# Patient Record
Sex: MALE | HISPANIC OR LATINO | Employment: UNEMPLOYED | URBAN - METROPOLITAN AREA
[De-identification: names, ages, dates, MRNs, and addresses within clinical notes are randomized per-mention and may not be internally consistent; named-entity substitution may affect disease eponyms.]

---

## 2018-07-02 ENCOUNTER — APPOINTMENT (OUTPATIENT)
Dept: RADIOLOGY | Facility: MEDICAL CENTER | Age: 38
End: 2018-07-02
Attending: EMERGENCY MEDICINE
Payer: COMMERCIAL

## 2018-07-02 ENCOUNTER — HOSPITAL ENCOUNTER (OUTPATIENT)
Facility: MEDICAL CENTER | Age: 38
End: 2018-07-03
Attending: EMERGENCY MEDICINE | Admitting: INTERNAL MEDICINE
Payer: COMMERCIAL

## 2018-07-02 DIAGNOSIS — R07.2 PRECORDIAL CHEST PAIN: ICD-10-CM

## 2018-07-02 LAB
ALBUMIN SERPL BCP-MCNC: 4.6 G/DL (ref 3.2–4.9)
ALBUMIN/GLOB SERPL: 1.6 G/DL
ALP SERPL-CCNC: 71 U/L (ref 30–99)
ALT SERPL-CCNC: 25 U/L (ref 2–50)
ANION GAP SERPL CALC-SCNC: 11 MMOL/L (ref 0–11.9)
APTT PPP: 27.3 SEC (ref 24.7–36)
AST SERPL-CCNC: 18 U/L (ref 12–45)
BASOPHILS # BLD AUTO: 0.4 % (ref 0–1.8)
BASOPHILS # BLD: 0.04 K/UL (ref 0–0.12)
BILIRUB SERPL-MCNC: 0.6 MG/DL (ref 0.1–1.5)
BNP SERPL-MCNC: 11 PG/ML (ref 0–100)
BUN SERPL-MCNC: 17 MG/DL (ref 8–22)
CALCIUM SERPL-MCNC: 9.5 MG/DL (ref 8.5–10.5)
CHLORIDE SERPL-SCNC: 106 MMOL/L (ref 96–112)
CO2 SERPL-SCNC: 22 MMOL/L (ref 20–33)
CREAT SERPL-MCNC: 0.86 MG/DL (ref 0.5–1.4)
EKG IMPRESSION: NORMAL
EOSINOPHIL # BLD AUTO: 0.13 K/UL (ref 0–0.51)
EOSINOPHIL NFR BLD: 1.4 % (ref 0–6.9)
ERYTHROCYTE [DISTWIDTH] IN BLOOD BY AUTOMATED COUNT: 36.9 FL (ref 35.9–50)
GLOBULIN SER CALC-MCNC: 2.9 G/DL (ref 1.9–3.5)
GLUCOSE SERPL-MCNC: 103 MG/DL (ref 65–99)
HCT VFR BLD AUTO: 42.9 % (ref 42–52)
HGB BLD-MCNC: 15.6 G/DL (ref 14–18)
IMM GRANULOCYTES # BLD AUTO: 0.04 K/UL (ref 0–0.11)
IMM GRANULOCYTES NFR BLD AUTO: 0.4 % (ref 0–0.9)
INR PPP: 1.02 (ref 0.87–1.13)
LIPASE SERPL-CCNC: 29 U/L (ref 11–82)
LYMPHOCYTES # BLD AUTO: 2.81 K/UL (ref 1–4.8)
LYMPHOCYTES NFR BLD: 30.9 % (ref 22–41)
MCH RBC QN AUTO: 30.7 PG (ref 27–33)
MCHC RBC AUTO-ENTMCNC: 36.4 G/DL (ref 33.7–35.3)
MCV RBC AUTO: 84.4 FL (ref 81.4–97.8)
MONOCYTES # BLD AUTO: 0.63 K/UL (ref 0–0.85)
MONOCYTES NFR BLD AUTO: 6.9 % (ref 0–13.4)
NEUTROPHILS # BLD AUTO: 5.44 K/UL (ref 1.82–7.42)
NEUTROPHILS NFR BLD: 60 % (ref 44–72)
NRBC # BLD AUTO: 0 K/UL
NRBC BLD-RTO: 0 /100 WBC
PLATELET # BLD AUTO: 207 K/UL (ref 164–446)
PMV BLD AUTO: 10.9 FL (ref 9–12.9)
POTASSIUM SERPL-SCNC: 3.5 MMOL/L (ref 3.6–5.5)
PROT SERPL-MCNC: 7.5 G/DL (ref 6–8.2)
PROTHROMBIN TIME: 13.1 SEC (ref 12–14.6)
RBC # BLD AUTO: 5.08 M/UL (ref 4.7–6.1)
SODIUM SERPL-SCNC: 139 MMOL/L (ref 135–145)
TROPONIN I SERPL-MCNC: <0.01 NG/ML (ref 0–0.04)
WBC # BLD AUTO: 9.1 K/UL (ref 4.8–10.8)

## 2018-07-02 PROCEDURE — 83690 ASSAY OF LIPASE: CPT

## 2018-07-02 PROCEDURE — 85610 PROTHROMBIN TIME: CPT

## 2018-07-02 PROCEDURE — 99285 EMERGENCY DEPT VISIT HI MDM: CPT

## 2018-07-02 PROCEDURE — A9270 NON-COVERED ITEM OR SERVICE: HCPCS | Performed by: EMERGENCY MEDICINE

## 2018-07-02 PROCEDURE — G0378 HOSPITAL OBSERVATION PER HR: HCPCS

## 2018-07-02 PROCEDURE — 80053 COMPREHEN METABOLIC PANEL: CPT

## 2018-07-02 PROCEDURE — 84484 ASSAY OF TROPONIN QUANT: CPT

## 2018-07-02 PROCEDURE — 700102 HCHG RX REV CODE 250 W/ 637 OVERRIDE(OP): Performed by: EMERGENCY MEDICINE

## 2018-07-02 PROCEDURE — 93005 ELECTROCARDIOGRAM TRACING: CPT

## 2018-07-02 PROCEDURE — 85730 THROMBOPLASTIN TIME PARTIAL: CPT

## 2018-07-02 PROCEDURE — 83880 ASSAY OF NATRIURETIC PEPTIDE: CPT

## 2018-07-02 PROCEDURE — 85025 COMPLETE CBC W/AUTO DIFF WBC: CPT

## 2018-07-02 PROCEDURE — 71045 X-RAY EXAM CHEST 1 VIEW: CPT

## 2018-07-02 PROCEDURE — 93005 ELECTROCARDIOGRAM TRACING: CPT | Performed by: EMERGENCY MEDICINE

## 2018-07-02 PROCEDURE — 99220 PR INITIAL OBSERVATION CARE,LEVL III: CPT | Performed by: INTERNAL MEDICINE

## 2018-07-02 RX ORDER — PROMETHAZINE HYDROCHLORIDE 25 MG/1
12.5-25 SUPPOSITORY RECTAL EVERY 4 HOURS PRN
Status: DISCONTINUED | OUTPATIENT
Start: 2018-07-02 | End: 2018-07-03 | Stop reason: HOSPADM

## 2018-07-02 RX ORDER — ASPIRIN 325 MG
325 TABLET ORAL DAILY
Status: DISCONTINUED | OUTPATIENT
Start: 2018-07-03 | End: 2018-07-03 | Stop reason: HOSPADM

## 2018-07-02 RX ORDER — BISACODYL 10 MG
10 SUPPOSITORY, RECTAL RECTAL
Status: DISCONTINUED | OUTPATIENT
Start: 2018-07-02 | End: 2018-07-03 | Stop reason: HOSPADM

## 2018-07-02 RX ORDER — IBUPROFEN 200 MG
600 TABLET ORAL EVERY 8 HOURS PRN
COMMUNITY

## 2018-07-02 RX ORDER — NITROGLYCERIN 0.4 MG/1
0.4 TABLET SUBLINGUAL
Status: DISCONTINUED | OUTPATIENT
Start: 2018-07-02 | End: 2018-07-03 | Stop reason: HOSPADM

## 2018-07-02 RX ORDER — CHLORAL HYDRATE 500 MG
2000 CAPSULE ORAL EVERY EVENING
Status: SHIPPED | COMMUNITY
End: 2024-02-08

## 2018-07-02 RX ORDER — PROMETHAZINE HYDROCHLORIDE 25 MG/1
12.5-25 TABLET ORAL EVERY 4 HOURS PRN
Status: DISCONTINUED | OUTPATIENT
Start: 2018-07-02 | End: 2018-07-03 | Stop reason: HOSPADM

## 2018-07-02 RX ORDER — ONDANSETRON 2 MG/ML
4 INJECTION INTRAMUSCULAR; INTRAVENOUS EVERY 4 HOURS PRN
Status: DISCONTINUED | OUTPATIENT
Start: 2018-07-02 | End: 2018-07-03 | Stop reason: HOSPADM

## 2018-07-02 RX ORDER — ASPIRIN 300 MG/1
300 SUPPOSITORY RECTAL DAILY
Status: DISCONTINUED | OUTPATIENT
Start: 2018-07-03 | End: 2018-07-03 | Stop reason: HOSPADM

## 2018-07-02 RX ORDER — ASPIRIN 81 MG/1
324 TABLET, CHEWABLE ORAL ONCE
Status: COMPLETED | OUTPATIENT
Start: 2018-07-02 | End: 2018-07-02

## 2018-07-02 RX ORDER — ACETAMINOPHEN 325 MG/1
650 TABLET ORAL EVERY 6 HOURS PRN
Status: DISCONTINUED | OUTPATIENT
Start: 2018-07-02 | End: 2018-07-03 | Stop reason: HOSPADM

## 2018-07-02 RX ORDER — POLYETHYLENE GLYCOL 3350 17 G/17G
1 POWDER, FOR SOLUTION ORAL
Status: DISCONTINUED | OUTPATIENT
Start: 2018-07-02 | End: 2018-07-03 | Stop reason: HOSPADM

## 2018-07-02 RX ORDER — ASPIRIN 81 MG/1
324 TABLET, CHEWABLE ORAL DAILY
Status: DISCONTINUED | OUTPATIENT
Start: 2018-07-03 | End: 2018-07-03 | Stop reason: HOSPADM

## 2018-07-02 RX ORDER — ONDANSETRON 4 MG/1
4 TABLET, ORALLY DISINTEGRATING ORAL EVERY 4 HOURS PRN
Status: DISCONTINUED | OUTPATIENT
Start: 2018-07-02 | End: 2018-07-03 | Stop reason: HOSPADM

## 2018-07-02 RX ORDER — ASPIRIN 300 MG/1
300 SUPPOSITORY RECTAL ONCE
Status: COMPLETED | OUTPATIENT
Start: 2018-07-02 | End: 2018-07-02

## 2018-07-02 RX ORDER — AMOXICILLIN 250 MG
2 CAPSULE ORAL 2 TIMES DAILY
Status: DISCONTINUED | OUTPATIENT
Start: 2018-07-03 | End: 2018-07-03 | Stop reason: HOSPADM

## 2018-07-02 RX ADMIN — ASPIRIN 324 MG: 81 TABLET, CHEWABLE ORAL at 19:21

## 2018-07-02 ASSESSMENT — ENCOUNTER SYMPTOMS
DIZZINESS: 1
NECK PAIN: 0
BRUISES/BLEEDS EASILY: 0
COUGH: 0
SORE THROAT: 0
BLURRED VISION: 0
SPUTUM PRODUCTION: 0
FLANK PAIN: 0
WHEEZING: 0
BLOOD IN STOOL: 0
SHORTNESS OF BREATH: 1
PALPITATIONS: 0
DIAPHORESIS: 1
VOMITING: 0
BACK PAIN: 0
HEADACHES: 0
ABDOMINAL PAIN: 0
NAUSEA: 0
SEIZURES: 0
DIARRHEA: 0
FEVER: 0
FOCAL WEAKNESS: 0
CHILLS: 0
MYALGIAS: 0

## 2018-07-02 ASSESSMENT — PAIN SCALES - GENERAL: PAINLEVEL_OUTOF10: 4

## 2018-07-03 ENCOUNTER — APPOINTMENT (OUTPATIENT)
Dept: RADIOLOGY | Facility: MEDICAL CENTER | Age: 38
End: 2018-07-03
Attending: INTERNAL MEDICINE
Payer: COMMERCIAL

## 2018-07-03 ENCOUNTER — PATIENT OUTREACH (OUTPATIENT)
Dept: HEALTH INFORMATION MANAGEMENT | Facility: OTHER | Age: 38
End: 2018-07-03

## 2018-07-03 VITALS
TEMPERATURE: 98.6 F | WEIGHT: 198.19 LBS | HEART RATE: 82 BPM | DIASTOLIC BLOOD PRESSURE: 75 MMHG | BODY MASS INDEX: 25.44 KG/M2 | OXYGEN SATURATION: 98 % | SYSTOLIC BLOOD PRESSURE: 127 MMHG | RESPIRATION RATE: 16 BRPM | HEIGHT: 74 IN

## 2018-07-03 PROBLEM — E87.6 HYPOKALEMIA: Status: RESOLVED | Noted: 2018-07-03 | Resolved: 2018-07-03

## 2018-07-03 PROBLEM — R07.89 ATYPICAL CHEST PAIN: Status: RESOLVED | Noted: 2018-07-03 | Resolved: 2018-07-03

## 2018-07-03 PROBLEM — E87.6 HYPOKALEMIA: Status: ACTIVE | Noted: 2018-07-03

## 2018-07-03 PROBLEM — R07.9 CHEST PAIN: Status: ACTIVE | Noted: 2018-07-03

## 2018-07-03 PROBLEM — R07.89 ATYPICAL CHEST PAIN: Status: ACTIVE | Noted: 2018-07-03

## 2018-07-03 PROBLEM — I10 ESSENTIAL HYPERTENSION: Status: ACTIVE | Noted: 2018-07-03

## 2018-07-03 LAB
ANION GAP SERPL CALC-SCNC: 14 MMOL/L (ref 0–11.9)
BASOPHILS # BLD AUTO: 0.6 % (ref 0–1.8)
BASOPHILS # BLD: 0.06 K/UL (ref 0–0.12)
BUN SERPL-MCNC: 14 MG/DL (ref 8–22)
CALCIUM SERPL-MCNC: 9.5 MG/DL (ref 8.5–10.5)
CHLORIDE SERPL-SCNC: 110 MMOL/L (ref 96–112)
CHOLEST SERPL-MCNC: 153 MG/DL (ref 100–199)
CO2 SERPL-SCNC: 20 MMOL/L (ref 20–33)
CREAT SERPL-MCNC: 0.76 MG/DL (ref 0.5–1.4)
EKG IMPRESSION: NORMAL
EOSINOPHIL # BLD AUTO: 0.07 K/UL (ref 0–0.51)
EOSINOPHIL NFR BLD: 0.7 % (ref 0–6.9)
ERYTHROCYTE [DISTWIDTH] IN BLOOD BY AUTOMATED COUNT: 37.4 FL (ref 35.9–50)
EST. AVERAGE GLUCOSE BLD GHB EST-MCNC: 103 MG/DL
GLUCOSE SERPL-MCNC: 103 MG/DL (ref 65–99)
HBA1C MFR BLD: 5.2 % (ref 0–5.6)
HCT VFR BLD AUTO: 43 % (ref 42–52)
HDLC SERPL-MCNC: 34 MG/DL
HGB BLD-MCNC: 15 G/DL (ref 14–18)
IMM GRANULOCYTES # BLD AUTO: 0.02 K/UL (ref 0–0.11)
IMM GRANULOCYTES NFR BLD AUTO: 0.2 % (ref 0–0.9)
LDLC SERPL CALC-MCNC: 97 MG/DL
LYMPHOCYTES # BLD AUTO: 3.25 K/UL (ref 1–4.8)
LYMPHOCYTES NFR BLD: 34.7 % (ref 22–41)
MCH RBC QN AUTO: 29.8 PG (ref 27–33)
MCHC RBC AUTO-ENTMCNC: 34.9 G/DL (ref 33.7–35.3)
MCV RBC AUTO: 85.5 FL (ref 81.4–97.8)
MONOCYTES # BLD AUTO: 0.7 K/UL (ref 0–0.85)
MONOCYTES NFR BLD AUTO: 7.5 % (ref 0–13.4)
NEUTROPHILS # BLD AUTO: 5.27 K/UL (ref 1.82–7.42)
NEUTROPHILS NFR BLD: 56.3 % (ref 44–72)
NRBC # BLD AUTO: 0 K/UL
NRBC BLD-RTO: 0 /100 WBC
PLATELET # BLD AUTO: 218 K/UL (ref 164–446)
PMV BLD AUTO: 10.9 FL (ref 9–12.9)
POTASSIUM SERPL-SCNC: 3.7 MMOL/L (ref 3.6–5.5)
RBC # BLD AUTO: 5.03 M/UL (ref 4.7–6.1)
SODIUM SERPL-SCNC: 144 MMOL/L (ref 135–145)
TRIGL SERPL-MCNC: 111 MG/DL (ref 0–149)
TROPONIN I SERPL-MCNC: <0.01 NG/ML (ref 0–0.04)
TROPONIN I SERPL-MCNC: <0.01 NG/ML (ref 0–0.04)
TSH SERPL DL<=0.005 MIU/L-ACNC: 0.89 UIU/ML (ref 0.38–5.33)
WBC # BLD AUTO: 9.4 K/UL (ref 4.8–10.8)

## 2018-07-03 PROCEDURE — 80061 LIPID PANEL: CPT

## 2018-07-03 PROCEDURE — 84484 ASSAY OF TROPONIN QUANT: CPT

## 2018-07-03 PROCEDURE — 93005 ELECTROCARDIOGRAM TRACING: CPT | Performed by: INTERNAL MEDICINE

## 2018-07-03 PROCEDURE — 700102 HCHG RX REV CODE 250 W/ 637 OVERRIDE(OP): Performed by: INTERNAL MEDICINE

## 2018-07-03 PROCEDURE — 80048 BASIC METABOLIC PNL TOTAL CA: CPT

## 2018-07-03 PROCEDURE — 85025 COMPLETE CBC W/AUTO DIFF WBC: CPT

## 2018-07-03 PROCEDURE — G0378 HOSPITAL OBSERVATION PER HR: HCPCS

## 2018-07-03 PROCEDURE — 93010 ELECTROCARDIOGRAM REPORT: CPT | Performed by: INTERNAL MEDICINE

## 2018-07-03 PROCEDURE — A9502 TC99M TETROFOSMIN: HCPCS

## 2018-07-03 PROCEDURE — 700111 HCHG RX REV CODE 636 W/ 250 OVERRIDE (IP)

## 2018-07-03 PROCEDURE — 99217 PR OBSERVATION CARE DISCHARGE: CPT | Performed by: INTERNAL MEDICINE

## 2018-07-03 PROCEDURE — A9270 NON-COVERED ITEM OR SERVICE: HCPCS | Performed by: INTERNAL MEDICINE

## 2018-07-03 PROCEDURE — 83036 HEMOGLOBIN GLYCOSYLATED A1C: CPT

## 2018-07-03 PROCEDURE — 84443 ASSAY THYROID STIM HORMONE: CPT

## 2018-07-03 RX ORDER — OMEPRAZOLE 20 MG/1
20 CAPSULE, DELAYED RELEASE ORAL DAILY
Qty: 30 CAP | Refills: 0 | Status: SHIPPED | COMMUNITY
Start: 2018-07-03 | End: 2024-02-08

## 2018-07-03 RX ORDER — REGADENOSON 0.08 MG/ML
INJECTION, SOLUTION INTRAVENOUS
Status: COMPLETED
Start: 2018-07-03 | End: 2018-07-03

## 2018-07-03 RX ADMIN — REGADENOSON 0.4 MG: 0.08 INJECTION, SOLUTION INTRAVENOUS at 10:06

## 2018-07-03 RX ADMIN — ACETAMINOPHEN 650 MG: 325 TABLET, FILM COATED ORAL at 00:39

## 2018-07-03 RX ADMIN — ASPIRIN 324 MG: 81 TABLET, CHEWABLE ORAL at 11:02

## 2018-07-03 ASSESSMENT — COPD QUESTIONNAIRES
DO YOU EVER COUGH UP ANY MUCUS OR PHLEGM?: NO/ONLY WITH OCCASIONAL COLDS OR INFECTIONS
COPD SCREENING SCORE: 2
DURING THE PAST 4 WEEKS HOW MUCH DID YOU FEEL SHORT OF BREATH: NONE/LITTLE OF THE TIME
HAVE YOU SMOKED AT LEAST 100 CIGARETTES IN YOUR ENTIRE LIFE: YES

## 2018-07-03 ASSESSMENT — PATIENT HEALTH QUESTIONNAIRE - PHQ9
SUM OF ALL RESPONSES TO PHQ9 QUESTIONS 1 AND 2: 0
2. FEELING DOWN, DEPRESSED, IRRITABLE, OR HOPELESS: NOT AT ALL
2. FEELING DOWN, DEPRESSED, IRRITABLE, OR HOPELESS: NOT AT ALL
SUM OF ALL RESPONSES TO PHQ9 QUESTIONS 1 AND 2: 0
1. LITTLE INTEREST OR PLEASURE IN DOING THINGS: NOT AT ALL
1. LITTLE INTEREST OR PLEASURE IN DOING THINGS: NOT AT ALL

## 2018-07-03 ASSESSMENT — LIFESTYLE VARIABLES
CONSUMPTION TOTAL: NEGATIVE
TOTAL SCORE: 0
EVER_SMOKED: YES
EVER_SMOKED: NEVER
HAVE YOU EVER FELT YOU SHOULD CUT DOWN ON YOUR DRINKING: NO
ON A TYPICAL DAY WHEN YOU DRINK ALCOHOL HOW MANY DRINKS DO YOU HAVE: 2
TOTAL SCORE: 0
EVER FELT BAD OR GUILTY ABOUT YOUR DRINKING: NO
ALCOHOL_USE: YES
HOW MANY TIMES IN THE PAST YEAR HAVE YOU HAD 5 OR MORE DRINKS IN A DAY: 0
TOTAL SCORE: 0
AVERAGE NUMBER OF DAYS PER WEEK YOU HAVE A DRINK CONTAINING ALCOHOL: 2
HAVE PEOPLE ANNOYED YOU BY CRITICIZING YOUR DRINKING: NO
EVER HAD A DRINK FIRST THING IN THE MORNING TO STEADY YOUR NERVES TO GET RID OF A HANGOVER: NO

## 2018-07-03 ASSESSMENT — PAIN SCALES - GENERAL
PAINLEVEL_OUTOF10: 4
PAINLEVEL_OUTOF10: 4

## 2018-07-03 NOTE — PROGRESS NOTES
IV dc'd.  Discharge instructions given to patient; patient verbalizes understanding, all questions answered.  Copy of DC summary provided, signed copy in chart.  Pt states personal belongings are in possession.  Pt escorted off unit by this nurse without incident.

## 2018-07-03 NOTE — DISCHARGE PLANNING
Care Transition Team Assessment      Met with pt and his wife at bedside. According to pt he lives with his wife and 2 children aged 10 and 13 years. Pt said he is independent at baseline, does not use assistive devices. Pt confirmed he does not have insurance, referred to Patient Financial Assistance. Spoke with Saniya from [facility] who confirmed she will see pt.    Information Source  Orientation : Oriented x 4  Information Given By: Patient, Spouse  Who is responsible for making decisions for patient? : Patient    Readmission Evaluation  Is this a readmission?: No    Elopement Risk  Legal Hold: No  Ambulatory or Self Mobile in Wheelchair: Yes  Disoriented: No  Psychiatric Symptoms: None  History of Wandering: No  Elopement this Admit: No  Vocalizing Wanting to Leave: No  Displays Behaviors, Body Language Wanting to Leave: No-Not at Risk for Elopement  Elopement Risk: Not at Risk for Elopement    Interdisciplinary Discharge Planning  Does Admitting Nurse Feel This Could be a Complex Discharge?: No  Primary Care Physician: none  Lives with - Patient's Self Care Capacity: Spouse  Patient or legal guardian wants to designate a caregiver (see row info): No  Support Systems: Spouse / Significant Other  Able to Return to Previous ADL's: Yes  Mobility Issues: No  Prior Services: None  Patient Expects to be Discharged to:: Home  Assistance Needed: No  Durable Medical Equipment: Not Applicable    Discharge Preparedness  What are your discharge supports?: Spouse  Prior Functional Level: Independent with Activities of Daily Living  Difficulity with ADLs: None  Difficulity with IADLs: None    Functional Assesment  Prior Functional Level: Independent with Activities of Daily Living    Finances  Prescription Coverage: No  Prescription Coverage Comments: No Insurance    Vision / Hearing Impairment  Vision Impairment : No  Hearing Impairment : No      Advance Directive  Advance Directive?: None     Anticipated Discharge  Information  Anticipated discharge disposition: Home  Discharge Address: [address]  Discharge Contact Phone Number: [phone]

## 2018-07-03 NOTE — PROGRESS NOTES
Pt arrived from er via wheelchair.  Admission and assessment complete.  No distress noted.  Call light in reach.  Bed in low position.  Will monitor

## 2018-07-03 NOTE — ASSESSMENT & PLAN NOTE
Uncontrolled  I will start the patient on losartan 25 mg daily  IV labetalol as needed for SBP greater than 160

## 2018-07-03 NOTE — ASSESSMENT & PLAN NOTE
Rule out ACS  Initial EKG and troponin negative  Continuous cardiac monitoring with serial EKG and troponin  Nuclear medicine cardiac stress test in the morning if troponin remains negative  Patient has been given full dose of aspirin  Check lipid panel, TSH and hemoglobin A1c  Nitro and morphine when necessary for chest pain

## 2018-07-03 NOTE — H&P
Hospital Medicine History & Physical Note    Date of Service  7/2/2018    Primary Care Physician  Pcp Pt States None    Consultants  None    Code Status  Full Code    Chief Complaint  Chest pain    History of Presenting Illness  38 y.o. male who presented 7/2/2018 with intermittent chest pain for the past 6 days.  Patient reports substernal pressure-like chest pain with radiation to the left shoulder.  He rates the pain at 7/10 intensity.  He denies any relieving or exacerbating factors.  He reports associated shortness of breath, diaphoresis and lightheadedness.  He denies any loss of consciousness.  He denies any leg swelling.  He denies any recent travel or history of blood clots.    Initial EKG independently reviewed by me reveals sinus rhythm with no acute ischemic changes.  Chest x-ray independently reviewed by me reveals no acute cardiopulmonary process, no focal pulmonary infiltrates.  Initial troponin is less than 0.01    Review of Systems  Review of Systems   Constitutional: Positive for diaphoresis. Negative for chills and fever.   HENT: Negative for hearing loss and sore throat.    Eyes: Negative for blurred vision.   Respiratory: Positive for shortness of breath. Negative for cough, sputum production and wheezing.    Cardiovascular: Positive for chest pain. Negative for palpitations and leg swelling.   Gastrointestinal: Negative for abdominal pain, blood in stool, diarrhea, nausea and vomiting.   Genitourinary: Negative for dysuria, flank pain and urgency.   Musculoskeletal: Negative for back pain, joint pain, myalgias and neck pain.   Skin: Negative for rash.   Neurological: Positive for dizziness. Negative for focal weakness, seizures and headaches.   Endo/Heme/Allergies: Does not bruise/bleed easily.   Psychiatric/Behavioral: Negative for suicidal ideas.   All other systems reviewed and are negative.      Past Medical History  Hypertension    Surgical History  No pertinent surgical history    Family  History  Denies family history of coronary artery disease    Social History   reports that he quit smoking about 7 years ago. He has never used smokeless tobacco. He reports that he drinks alcohol. He reports that he does not use drugs.    Allergies  No Known Allergies    Medications  Prior to Admission Medications   Prescriptions Last Dose Informant Patient Reported? Taking?   Omega-3 Fatty Acids (FISH OIL) 1000 MG Cap capsule 7/1/2018 at 1930  Yes Yes   Sig: Take 2,000 mg by mouth every evening.   ibuprofen (MOTRIN) 200 MG Tab 7/2/2018 at 1100  Yes Yes   Sig: Take 600 mg by mouth every 8 hours as needed for Mild Pain.   multivitamin (THERAGRAN) Tab 7/2/2018 at 0730  Yes Yes   Sig: Take 1 Tab by mouth every day.      Facility-Administered Medications: None       Physical Exam  Blood Pressure: (!) 166/97   Temperature: 37.1 °C (98.8 °F)   Pulse: 76   Respiration: 16   Pulse Oximetry: 94 %     Physical Exam   Constitutional: He is oriented to person, place, and time. He appears well-developed and well-nourished. No distress.   HENT:   Head: Normocephalic and atraumatic.   Mouth/Throat: Oropharynx is clear and moist.   Eyes: Conjunctivae are normal. Pupils are equal, round, and reactive to light. No scleral icterus.   Neck: Normal range of motion. Neck supple.   Cardiovascular: Normal rate, regular rhythm and normal heart sounds.    Pulmonary/Chest: Effort normal and breath sounds normal. No respiratory distress. He has no wheezes. He has no rales.   Abdominal: Soft. Bowel sounds are normal. He exhibits no distension. There is no tenderness. There is no rebound.   Musculoskeletal: Normal range of motion. He exhibits no edema or tenderness.   Lymphadenopathy:     He has no cervical adenopathy.   Neurological: He is alert and oriented to person, place, and time. No cranial nerve deficit. Coordination normal.   Skin: Skin is warm. No rash noted.   Psychiatric: He has a normal mood and affect. His behavior is normal.    Nursing note and vitals reviewed.      Laboratory:  Recent Labs      07/02/18   1855   WBC  9.1   RBC  5.08   HEMOGLOBIN  15.6   HEMATOCRIT  42.9   MCV  84.4   MCH  30.7   MCHC  36.4*   RDW  36.9   PLATELETCT  207   MPV  10.9     Recent Labs      07/02/18   1855   SODIUM  139   POTASSIUM  3.5*   CHLORIDE  106   CO2  22   GLUCOSE  103*   BUN  17   CREATININE  0.86   CALCIUM  9.5     Recent Labs      07/02/18   1855   ALTSGPT  25   ASTSGOT  18   ALKPHOSPHAT  71   TBILIRUBIN  0.6   LIPASE  29   GLUCOSE  103*     Recent Labs      07/02/18   1855   APTT  27.3   INR  1.02     Recent Labs      07/02/18   1855   BNPBTYPENAT  11         Lab Results   Component Value Date    TROPONINI <0.01 07/02/2018       Urinalysis:    No results found for: SPECGRAVITY, GLUCOSEUR, KETONES, NITRITE, WBCURINE, RBCURINE, BACTERIA, EPITHELCELL     Imaging:  DX-CHEST-PORTABLE (1 VIEW)   Final Result      No radiographic evidence of acute cardiopulmonary process.      NM-CARDIAC STRESS TEST    (Results Pending)         Assessment/Plan:  I anticipate this patient is appropriate for observation status at this time.    Chest pain- (present on admission)   Assessment & Plan    Rule out ACS  Initial EKG and troponin negative  Continuous cardiac monitoring with serial EKG and troponin  Nuclear medicine cardiac stress test in the morning if troponin remains negative  Patient has been given full dose of aspirin  Check lipid panel, TSH and hemoglobin A1c  Nitro and morphine when necessary for chest pain          Essential hypertension   Assessment & Plan    Uncontrolled  I will start the patient on losartan 25 mg daily  IV labetalol as needed for SBP greater than 160        Hypokalemia- (present on admission)   Assessment & Plan    Replace with K Dur  Check mag  Monitor BMP            VTE prophylaxis: SCD

## 2018-07-03 NOTE — DISCHARGE INSTRUCTIONS
Discharge Instructions    Discharged to home by car with relative. Discharged via wheelchair, hospital escort: Yes.  Special equipment needed: Not Applicable    Be sure to schedule a follow-up appointment with your primary care doctor or any specialists as instructed.     Discharge Plan:   Diet Plan: Discussed  Activity Level: Discussed  Confirmed Follow up Appointment: Patient to Call and Schedule Appointment  Confirmed Symptoms Management: Discussed  Medication Reconciliation Updated: Yes  Influenza Vaccine Indication: Patient Refuses    I understand that a diet low in cholesterol, fat, and sodium is recommended for good health. Unless I have been given specific instructions below for another diet, I accept this instruction as my diet prescription.   Other diet: Heart healthy     Special Instructions: None    · Is patient discharged on Warfarin / Coumadin?   No     Depression / Suicide Risk    As [facility] are [facility], it is important to learn how to keep safe from harming yourself.    Recognize the warning signs:  · Abrupt changes in personality, positive or negative- including increase in energy   · Giving away possessions  · Change in eating patterns- significant weight changes-  positive or negative  · Change in sleeping patterns- unable to sleep or sleeping all the time   · Unwillingness or inability to communicate  · Depression  · Unusual sadness, discouragement and loneliness  · Talk of wanting to die  · Neglect of personal appearance   · Rebelliousness- reckless behavior  · Withdrawal from people/activities they love  · Confusion- inability to concentrate     If you or a loved one observes any of these behaviors or has concerns about self-harm, here's what you can do:  · Talk about it- your feelings and reasons for harming yourself  · Remove any means that you might use to hurt yourself (examples: pills, rope, extension cords, firearm)  · Get professional help from the  community (Mental Health, Substance Abuse, psychological counseling)  · Do not be alone:Call your Safe Contact- someone whom you trust who will be there for you.  · Call your local CRISIS HOTLINE 612-1353 or 943-334-0272  · Call your local Children's Mobile Crisis Response Team [place] (255) 517-0978 or www.[employer]  · Call the toll free National Suicide Prevention Hotlines   · National Suicide Prevention Lifeline 448-378-ZSYF (5283)  · National Hope Line Network 800-SUICIDE (083-9601)

## 2018-07-03 NOTE — ED TRIAGE NOTES
"Oswaldo Baptiste  Chief Complaint   Patient presents with   • Chest Pain   • Shortness of Breath     Pt ambulatory to triage with above complaint. Pt states pain started approx 30 minutes ago. Pt report radiation of pain to left shoulder. Pt also c/o feeling faint and near syncopal. Pt denies cardiac hx or blood thinners.  EKG completed in triage.  Pt [place] speaking only. Ipad  used, [place] #[id number]    BP (!) 166/97   Pulse 96   Temp 37.1 °C (98.8 °F)   Resp 16   Ht 1.854 m (6' 1\")   Wt 91.7 kg (202 lb 2.6 oz)   SpO2 99%   BMI 26.67 kg/m²     Pt informed of triage process and encouraged to notify staff of any changes or concerns. Pt verbalized understanding of instructions.Pt placed back in lobby.     "

## 2018-07-03 NOTE — ED PROVIDER NOTES
"ED Provider Note    CHIEF COMPLAINT  Chief Complaint   Patient presents with   • Chest Pain   • Shortness of Breath       hospitals  Oswaldo Baptiste is a 38 y.o. male who presents complaining of chest pain. For the last 6 days or so the patient's had off-and-on chest pain. He demonstrates this as a clenched fist in his left precordium. It radiates towards her shoulder. Skin off and on with no clear alleviating or exacerbating factor. His associated dizziness. He has no leg pain or swelling. No recent trips or travel. No belly pain. There is no other complaint.    PAST MEDICAL HISTORY  None    FAMILY HISTORY  History reviewed. No pertinent family history.    SOCIAL HISTORY  Social History   Substance Use Topics   • Smoking status: Former Smoker     Quit date: 2/15/2011   • Smokeless tobacco: Never Used      Comment: 2  daily   • Alcohol use Yes      Comment: 24   12 oz weekly     He is here with his wife    SURGICAL HISTORY  History reviewed. No pertinent surgical history.    CURRENT MEDICATIONS  Home Medications     Reviewed by Vincent Maldonado (Pharmacy Tech) on 07/02/18 at 2010  Med List Status: Complete   Medication Last Dose Status   ibuprofen (MOTRIN) 200 MG Tab 7/2/2018 Active   multivitamin (THERAGRAN) Tab 7/2/2018 Active   Omega-3 Fatty Acids (FISH OIL) 1000 MG Cap capsule 7/1/2018 Active                I have reviewed the nurses notes and/or the list brought with the patient.    ALLERGIES  No Known Allergies    REVIEW OF SYSTEMS  See HPI for further details. Review of systems as above, otherwise all other systems are negative.     PHYSICAL EXAM  VITAL SIGNS: BP (!) 166/97   Pulse 76   Temp 37.1 °C (98.8 °F)   Resp (!) 31   Ht 1.854 m (6' 1\")   Wt 91.7 kg (202 lb 2.6 oz)   SpO2 95%   BMI 26.67 kg/m²   Constitutional: Well appearing patient in no acute distress.  Not toxic, nor ill in appearance.  HENT: Mucus membranes moist.  Oropharynx is clear.  Eyes: Pupils equally round.  No scleral icterus. "   Neck: Full nontender range of motion.  Lymphatic: No cervical lymphadenopathy noted.   Cardiovascular: Regular heart rate and rhythm.  No murmurs, rubs, nor gallop appreciated.   Thorax & Lungs: Chest is nontender.  Lungs are clear to auscultation with good air movement bilaterally.  No wheeze, rhonchi, nor rales.   Abdomen: Soft, with no tenderness, rebound nor guarding.  No mass, pulsatile mass, nor hepatosplenomegaly appreciated.  Skin: No purpura nor petechia noted.  Extremities/Musculoskeletal: No sign of trauma.  Calves are nontender with no cords nor edema.  No Edward's sign.  Pulses are intact all around.   Neurologic: Alert & oriented.  Strength and sensation is intact all around.   Psychiatric: Normal affect appropriate for the clinical situation.    EKG  I interpreted this EKG myself.  This is a 12-lead study.  The rhythm is sinus with a rate of 90.  There are no ST segment nor T wave abnormalities.  Interpretation: No ST segment elevation myocardial infarction..    LABS  Labs Reviewed   CBC WITH DIFFERENTIAL - Abnormal; Notable for the following:        Result Value    MCHC 36.4 (*)     All other components within normal limits    Narrative:     Indicate which anticoagulants the patient is on:->UNKNOWN   COMP METABOLIC PANEL - Abnormal; Notable for the following:     Potassium 3.5 (*)     Glucose 103 (*)     All other components within normal limits    Narrative:     Indicate which anticoagulants the patient is on:->UNKNOWN   BTYPE NATRIURETIC PEPTIDE    Narrative:     Indicate which anticoagulants the patient is on:->UNKNOWN   PROTHROMBIN TIME    Narrative:     Indicate which anticoagulants the patient is on:->UNKNOWN   APTT    Narrative:     Indicate which anticoagulants the patient is on:->UNKNOWN   LIPASE    Narrative:     Indicate which anticoagulants the patient is on:->UNKNOWN   TROPONIN    Narrative:     Indicate which anticoagulants the patient is on:->UNKNOWN   ESTIMATED GFR    Narrative:      Indicate which anticoagulants the patient is on:->UNKNOWN         RADIOLOGY/PROCEDURES  I have reviewed the patient's film interpretations myself, and they are read out by the radiologist as:   DX-CHEST-PORTABLE (1 VIEW)   Final Result      No radiographic evidence of acute cardiopulmonary process.        .    MEDICAL RECORD  I have reviewed patient's medical record and pertinent results are listed above.    COURSE & MEDICAL DECISION MAKING  I have reviewed any medical record information, laboratory studies and radiographic results as noted above.  This patient presents with chest pain, concerning for possible cardiac etiology. At this point will minimal the hospital for serial troponins and risk ratification. He is given aspirin. X-ray shows no evidence of infiltrate. The quality of the symptoms is not suspicious for pulmonary embolism nor aortic etiology. Further, he is negative for PE by the PE rule out criteria. I discussed the patient's case with Dr. Centeno, hospitalist. He will be admitting the patient.      FINAL IMPRESSION  1. Precordial chest pain           This dictation was created using voice recognition software.    Electronically signed by: Biju Roland, 7/2/2018 8:12 PM

## 2018-07-03 NOTE — ED NOTES
Med rec updated and complete.  Allergies reviewed.  Pt denies prescription medications.     services used  [place] # [id number]

## 2018-07-03 NOTE — ED NOTES
Bedside report given to Pankaj RN via [employer] Ipad. Answered questions, addressed needs, ensured call light within reach. Provided emotional support for pt.

## 2018-07-03 NOTE — DISCHARGE SUMMARY
Hospital Medicine Discharge Note     Patient ID:  Oswaldo Baptiste  [id number]  38 y.o.male  1980    Admit Date:  7/2/2018       Discharge Date:  7/3/2018    Primary Care Provider: Pcp Pt States None (referred to NABEEL)    Admitting Physician: Pankaj Centeno M.D.  Discharging Physician: DOUGLAS James/Roni Johansen MD    Chief Complaint: Chest pain    Discharge Diagnoses:   Active Problems:    Chest pain    Essential hypertension    Chronic Medical Problems:  History reviewed. No pertinent past medical history.    Code Status: Full Code    Hospital Summary:       Please refer to H&P by Dr. Centeno on 7/2/2018 for full details.      In brief, Oswaldo Baptiste is a 38 y.o. male who was admitted 7/2/2018 for substernal chest pain radiating to his left shoulder for the last 6 days. Workup was completed and revealed an EKG negative for acute ischemic changes, unremarkable chest x-ray, and negative troponins ×3.  Lab work was largely within normal limits.  Stress test was performed and revealed no myocardial infarct or inducible ischemia with normal left ventricular function.  His vital signs have remained stable and he is currently chest pain-free. He will be discharged on over-the-counter omeprazole to see if that provides some relief of his symptoms. I have asked him to follow-up with a primary care physician for future evaluation and management.     Therefore, he is discharged in good and stable condition with close outpatient follow-up.    Consultants: None    Studies:  Imaging/ Testing:      NM-CARDIAC STRESS TEST   Final Result      DX-CHEST-PORTABLE (1 VIEW)   Final Result      No radiographic evidence of acute cardiopulmonary process.        Laboratory:   Recent Labs      07/02/18   1855  07/03/18   0356   WBC  9.1  9.4   RBC  5.08  5.03   HEMOGLOBIN  15.6  15.0   HEMATOCRIT  42.9  43.0   MCV  84.4  85.5   MCH  30.7  29.8   MCHC  36.4*  34.9   RDW  36.9  37.4   PLATELETCT  207  218   MPV  10.9   10.9     Recent Labs      07/02/18   1855  07/03/18   0356   SODIUM  139  144   POTASSIUM  3.5*  3.7   CHLORIDE  106  110   CO2  22  20   GLUCOSE  103*  103*   BUN  17  14   CREATININE  0.86  0.76   CALCIUM  9.5  9.5     Recent Labs      07/02/18   1855  07/03/18   0356   ALTSGPT  25   --    ASTSGOT  18   --    ALKPHOSPHAT  71   --    TBILIRUBIN  0.6   --    LIPASE  29   --    GLUCOSE  103*  103*      Recent Labs      07/02/18   1855   BNPBTYPENAT  11     Recent Labs      07/03/18   0356   TRIGLYCERIDE  111   HDL  34*   LDL  97     Recent Labs      07/02/18   1855 07/03/18   0012  07/03/18   0356   TROPONINI  <0.01  <0.01  <0.01     Recent Labs      07/03/18   0012   TSHULTRASEN  0.890     Procedures/Surgeries: None    Disposition:  Discharge home    Condition:  Stable    Instructions:   Activity: As tolerated. Exercise encouraged.  Diet: Cardiac  Followup: PCP in 1 week  Instructions:  -Given instructions to return to the ER if any new or worsening symptoms, worsening condition, or failure to improve  -Call PCP for followup  -No smoking, no alcohol, no caffeine  -Encourage risk factor reduction with tobacco and alcohol abstinence, diet changes, weight loss, and exercise.     Follow-Up  [address] [address] [phone]    Please call to schedule your appointment to establish with a primary care physician. They do have a sliding fee scale based on income. Thank you     No future appointments.    Discharge Medications:        (YES)  Medication Reconciliation Completed     Medication List      CONTINUE taking these medications      Instructions   fish oil 1000 MG Caps capsule   Take 2,000 mg by mouth every evening.  Dose:  2000 mg     ibuprofen 200 MG Tabs  Commonly known as:  MOTRIN   Take 600 mg by mouth every 8 hours as needed for Mild Pain.  Dose:  600 mg     multivitamin Tabs   Take 1 Tab by mouth every day.  Dose:  1 Tab     omeprazole 20 MG delayed-release  capsule  Commonly known as:  PRILOSEC   Take 1 Cap by mouth every day.  Dose:  20 mg          Opioid prescription history checked: N/A    Total time of the discharge process exceeds 32 minutes. This included face to face with the patient, medication reconciliation, care coordination with Dr. Johansen involved in patient care and discussion and coordination with case management.     Please CC the above physicians    MARQUEZ Ornelas  7/3/2018  1:16 PM

## 2024-02-08 ENCOUNTER — OFFICE VISIT (OUTPATIENT)
Dept: CARDIOLOGY | Facility: MEDICAL CENTER | Age: 44
End: 2024-02-08
Attending: INTERNAL MEDICINE
Payer: COMMERCIAL

## 2024-02-08 VITALS
SYSTOLIC BLOOD PRESSURE: 130 MMHG | DIASTOLIC BLOOD PRESSURE: 74 MMHG | RESPIRATION RATE: 16 BRPM | HEART RATE: 85 BPM | WEIGHT: 199 LBS | OXYGEN SATURATION: 97 % | BODY MASS INDEX: 26.95 KG/M2 | HEIGHT: 72 IN

## 2024-02-08 DIAGNOSIS — I48.91 ATRIAL FIBRILLATION, UNSPECIFIED TYPE (HCC): ICD-10-CM

## 2024-02-08 PROCEDURE — 99204 OFFICE O/P NEW MOD 45 MIN: CPT | Performed by: INTERNAL MEDICINE

## 2024-02-08 PROCEDURE — 3075F SYST BP GE 130 - 139MM HG: CPT | Performed by: INTERNAL MEDICINE

## 2024-02-08 PROCEDURE — 93005 ELECTROCARDIOGRAM TRACING: CPT | Performed by: INTERNAL MEDICINE

## 2024-02-08 PROCEDURE — 3078F DIAST BP <80 MM HG: CPT | Performed by: INTERNAL MEDICINE

## 2024-02-08 PROCEDURE — 99202 OFFICE O/P NEW SF 15 MIN: CPT | Performed by: INTERNAL MEDICINE

## 2024-02-08 PROCEDURE — 99211 OFF/OP EST MAY X REQ PHY/QHP: CPT | Performed by: INTERNAL MEDICINE

## 2024-02-08 RX ORDER — APIXABAN 2.5 MG/1
2.5 TABLET, FILM COATED ORAL 2 TIMES DAILY
COMMUNITY
Start: 2024-01-23 | End: 2024-02-08

## 2024-02-08 RX ORDER — ASPIRIN 81 MG/1
81 TABLET ORAL DAILY
Qty: 100 TABLET | Refills: 4 | Status: SHIPPED | OUTPATIENT
Start: 2024-02-08

## 2024-02-08 RX ORDER — ATORVASTATIN CALCIUM 40 MG/1
40 TABLET, FILM COATED ORAL DAILY
COMMUNITY
Start: 2024-01-23 | End: 2024-02-08

## 2024-02-08 RX ORDER — METOPROLOL SUCCINATE 25 MG/1
25 TABLET, EXTENDED RELEASE ORAL DAILY
COMMUNITY
Start: 2024-01-23 | End: 2024-02-08 | Stop reason: SDUPTHER

## 2024-02-08 RX ORDER — LOSARTAN POTASSIUM AND HYDROCHLOROTHIAZIDE 12.5; 1 MG/1; MG/1
1 TABLET ORAL DAILY
COMMUNITY
Start: 2024-01-23

## 2024-02-08 RX ORDER — METOPROLOL SUCCINATE 50 MG/1
50 TABLET, EXTENDED RELEASE ORAL DAILY
Qty: 90 TABLET | Refills: 3 | Status: SHIPPED | OUTPATIENT
Start: 2024-02-08

## 2024-02-08 ASSESSMENT — ENCOUNTER SYMPTOMS
SYNCOPE: 0
COUGH: 0
CONSTIPATION: 0
ALTERED MENTAL STATUS: 0
PALPITATIONS: 0
DIZZINESS: 0
ORTHOPNEA: 0
DEPRESSION: 0
WEIGHT GAIN: 0
DECREASED APPETITE: 0
WEIGHT LOSS: 0
SHORTNESS OF BREATH: 0
DYSPNEA ON EXERTION: 0
ABDOMINAL PAIN: 0
PND: 0
CLAUDICATION: 0
DIARRHEA: 0
BLURRED VISION: 0
FLANK PAIN: 0
VOMITING: 0
FEVER: 0
NEAR-SYNCOPE: 0
NAUSEA: 0
IRREGULAR HEARTBEAT: 0
HEARTBURN: 0
BACK PAIN: 0

## 2024-02-08 NOTE — PROGRESS NOTES
Cardiology Note    Chief Complaint   Patient presents with    New Patient     N/P Dx:Unspecified atrial fibrillation       History of Present Illness: Oswaldo Baptiste is a 43 y.o. male who presents for initial visit. Referred by Bre Rowell PA-C for atrial fibrillation.     Admission 12/27/23 [facility] for AF RVR, alcohol abuse, hypertension. Seen by Dr Gifford cardiology. Converted to sinus on amiodarone. Started on eliquis. Documentation notes echo unremarkable.     Only had amiodarone for one month. Has now been off for two weeks. No recurrent palpitations. No other cardiac complaints. Compliant with remaining medications. Quit alcohol. No other toxic social habits. No relevant family history.     Review of Systems   Constitutional: Negative for decreased appetite, fever, malaise/fatigue, weight gain and weight loss.   HENT:  Negative for congestion and nosebleeds.    Eyes:  Negative for blurred vision.   Cardiovascular:  Negative for chest pain, claudication, dyspnea on exertion, irregular heartbeat, leg swelling, near-syncope, orthopnea, palpitations, paroxysmal nocturnal dyspnea and syncope.   Respiratory:  Negative for cough and shortness of breath.    Endocrine: Negative for cold intolerance and heat intolerance.   Skin:  Negative for rash.   Musculoskeletal:  Negative for back pain.   Gastrointestinal:  Negative for abdominal pain, constipation, diarrhea, heartburn, melena, nausea and vomiting.   Genitourinary:  Negative for dysuria, flank pain and hematuria.   Neurological:  Negative for dizziness.   Psychiatric/Behavioral:  Negative for altered mental status and depression.          History reviewed. No pertinent past medical history.      History reviewed. No pertinent surgical history.      Current Outpatient Medications   Medication Sig Dispense Refill    losartan-hydrochlorothiazide (HYZAAR) 100-12.5 MG per tablet Take 1 Tablet by mouth every day.      aspirin 81 MG EC tablet Take 1 Tablet by mouth  every day. 100 Tablet 4    metoprolol SR (TOPROL XL) 50 MG TABLET SR 24 HR Take 1 Tablet by mouth every day. 90 Tablet 3    multivitamin (THERAGRAN) Tab Take 1 Tab by mouth every day.      ibuprofen (MOTRIN) 200 MG Tab Take 600 mg by mouth every 8 hours as needed for Mild Pain.       No current facility-administered medications for this visit.         No Known Allergies      History reviewed. No pertinent family history.      Social History     Socioeconomic History    Marital status: Single     Spouse name: Not on file    Number of children: Not on file    Years of education: Not on file    Highest education level: Not on file   Occupational History    Not on file   Tobacco Use    Smoking status: Former     Current packs/day: 0.00     Types: Cigarettes     Quit date: 2/15/2011     Years since quittin.9    Smokeless tobacco: Never    Tobacco comments:     2  daily   Substance and Sexual Activity    Alcohol use: Not Currently    Drug use: Not Currently    Sexual activity: Not on file   Other Topics Concern    Not on file   Social History Narrative    Not on file     Social Determinants of Health     Financial Resource Strain: Not on file   Food Insecurity: Not on file   Transportation Needs: Not on file   Physical Activity: Not on file   Stress: Not on file   Social Connections: Not on file   Intimate Partner Violence: Not on file   Housing Stability: Not on file         Physical Exam:  Ambulatory Vitals  /74 (BP Location: Left arm, Patient Position: Sitting, BP Cuff Size: Adult)   Pulse 85   Resp 16   Ht 1.829 m (6')   Wt 90.3 kg (199 lb)   SpO2 97%    BP Readings from Last 4 Encounters:   24 130/74   18 127/75   07/15/16 149/95   11/15/15 156/91     Weight/BMI:   Vitals:    24 1455   BP: 130/74   Weight: 90.3 kg (199 lb)   Height: 1.829 m (6')    Body mass index is 26.99 kg/m².  Wt Readings from Last 4 Encounters:   24 90.3 kg (199 lb)   18 89.9 kg (198 lb 3.1 oz)    07/15/16 90.6 kg (199 lb 11.8 oz)   11/15/15 89.8 kg (198 lb)       Physical Exam  Constitutional:       General: He is not in acute distress.  HENT:      Head: Normocephalic and atraumatic.   Eyes:      Conjunctiva/sclera: Conjunctivae normal.      Pupils: Pupils are equal, round, and reactive to light.   Neck:      Vascular: No JVD.   Cardiovascular:      Rate and Rhythm: Normal rate and regular rhythm.      Heart sounds: Normal heart sounds. No murmur heard.     No friction rub. No gallop.   Pulmonary:      Effort: Pulmonary effort is normal. No respiratory distress.      Breath sounds: Normal breath sounds. No wheezing or rales.   Chest:      Chest wall: No tenderness.   Abdominal:      General: Bowel sounds are normal. There is no distension.      Palpations: Abdomen is soft.   Musculoskeletal:      Cervical back: Normal range of motion and neck supple.   Skin:     General: Skin is warm and dry.   Neurological:      Mental Status: He is alert and oriented to person, place, and time.   Psychiatric:         Mood and Affect: Affect normal.         Judgment: Judgment normal.         Lab Data Review:  Lab Results   Component Value Date/Time    CHOLSTRLTOT 153 07/03/2018 03:56 AM    LDL 97 07/03/2018 03:56 AM    HDL 34 (A) 07/03/2018 03:56 AM    TRIGLYCERIDE 111 07/03/2018 03:56 AM       Lab Results   Component Value Date/Time    SODIUM 144 07/03/2018 03:56 AM    POTASSIUM 3.7 07/03/2018 03:56 AM    CHLORIDE 110 07/03/2018 03:56 AM    CO2 20 07/03/2018 03:56 AM    GLUCOSE 103 (H) 07/03/2018 03:56 AM    BUN 14 07/03/2018 03:56 AM    CREATININE 0.76 07/03/2018 03:56 AM     CrCl cannot be calculated (Patient's most recent lab result is older than the maximum 7 days allowed.).  Lab Results   Component Value Date/Time    ALKPHOSPHAT 71 07/02/2018 06:55 PM    ASTSGOT 18 07/02/2018 06:55 PM    ALTSGPT 25 07/02/2018 06:55 PM    TBILIRUBIN 0.6 07/02/2018 06:55 PM      Lab Results   Component Value Date/Time    WBC 9.4  "07/03/2018 03:56 AM     Lab Results   Component Value Date/Time    HBA1C 5.2 07/03/2018 12:12 AM     No components found for: \"TROP\"      Cardiac Imaging and Procedures Review:      EKG 2/8/24 interpreted by me sinus 78    Mpi spect 7/3/2018   NUCLEAR IMAGING INTERPRETATION    No myocardial infarct or inducible ischemia.    Normal LEFT4 ventricular function.     Medical Decision Making:  Problem List Items Addressed This Visit       Atrial fibrillation (HCC)    Relevant Medications    losartan-hydrochlorothiazide (HYZAAR) 100-12.5 MG per tablet    aspirin 81 MG EC tablet    metoprolol SR (TOPROL XL) 50 MG TABLET SR 24 HR    Other Relevant Orders    EKG (Completed)    Cardiac Event Monitor     Paroxysmal AF - chadsvasc 1. Eliquis 2.5mg insufficient anyway. Convert to aspirin. Titrate metoprolol. Now off amiodarone check event monitor. Continue alcohol cessation.     It was my pleasure to meet with Mr. Isra Baptiste.                        "

## 2024-02-15 ENCOUNTER — NON-PROVIDER VISIT (OUTPATIENT)
Dept: CARDIOLOGY | Facility: MEDICAL CENTER | Age: 44
End: 2024-02-15
Attending: INTERNAL MEDICINE
Payer: COMMERCIAL

## 2024-02-15 DIAGNOSIS — I49.3 PVC'S (PREMATURE VENTRICULAR CONTRACTIONS): ICD-10-CM

## 2024-02-15 DIAGNOSIS — I48.91 ATRIAL FIBRILLATION, UNSPECIFIED TYPE (HCC): ICD-10-CM

## 2024-02-15 DIAGNOSIS — I49.1 APC (ATRIAL PREMATURE CONTRACTIONS): ICD-10-CM

## 2024-02-15 LAB — EKG IMPRESSION: NORMAL

## 2024-02-15 PROCEDURE — 93246 EXT ECG>7D<15D RECORDING: CPT

## 2024-02-15 PROCEDURE — 93010 ELECTROCARDIOGRAM REPORT: CPT | Performed by: INTERNAL MEDICINE

## 2024-02-15 NOTE — PROGRESS NOTES
Patient enrolled in the 14 day Zio Holter monitor per Jamie Stark MD.  In clinic hook up, monitor s/n [id number]. Pending EOS.

## 2024-02-15 NOTE — LETTER
March 14, 2024        Oswaldo Baptiste  [address] [place] [address]        Dear Oswaldo:  Error    If you have any questions or concerns, please don't hesitate to call.        Sincerely,        CARDIAC EVENT MONITOR-[facility] B 2    Electronically Signed

## 2024-02-16 ENCOUNTER — TELEPHONE (OUTPATIENT)
Dept: CARDIOLOGY | Facility: MEDICAL CENTER | Age: 44
End: 2024-02-16
Payer: COMMERCIAL

## 2024-02-16 NOTE — TELEPHONE ENCOUNTER
Pt came In for a Zio Patch. Pt has [employer]. There wa sno information regarding copay in the chart. I called Sherri to receive information of copay amount for Pt . Sherri quoted me an amount of $74.09 for the Zio patch. Geri Estes had received  information from [employer] for correct Co pay amount for pt. I took a cash payment of $74.09 from Pt.

## 2024-03-07 ENCOUNTER — TELEPHONE (OUTPATIENT)
Dept: CARDIOLOGY | Facility: MEDICAL CENTER | Age: 44
End: 2024-03-07
Payer: COMMERCIAL

## 2024-03-11 ENCOUNTER — TELEPHONE (OUTPATIENT)
Dept: CARDIOLOGY | Facility: MEDICAL CENTER | Age: 44
End: 2024-03-11
Payer: COMMERCIAL

## 2024-03-11 PROCEDURE — 93248 EXT ECG>7D<15D REV&INTERPJ: CPT | Performed by: INTERNAL MEDICINE

## 2024-03-11 NOTE — TELEPHONE ENCOUNTER
----- Message from Jamie Stark M.D. sent at 3/11/2024 10:47 AM PDT -----  Normal monitor. Not on Guidet. Thank you!

## 2024-06-24 ENCOUNTER — OFFICE VISIT (OUTPATIENT)
Dept: CARDIOLOGY | Facility: MEDICAL CENTER | Age: 44
End: 2024-06-24
Attending: INTERNAL MEDICINE
Payer: COMMERCIAL

## 2024-06-24 VITALS
HEIGHT: 72 IN | DIASTOLIC BLOOD PRESSURE: 84 MMHG | WEIGHT: 195 LBS | OXYGEN SATURATION: 96 % | HEART RATE: 81 BPM | BODY MASS INDEX: 26.41 KG/M2 | SYSTOLIC BLOOD PRESSURE: 120 MMHG | RESPIRATION RATE: 14 BRPM

## 2024-06-24 DIAGNOSIS — I10 ESSENTIAL HYPERTENSION: ICD-10-CM

## 2024-06-24 DIAGNOSIS — I48.91 ATRIAL FIBRILLATION, UNSPECIFIED TYPE (HCC): ICD-10-CM

## 2024-06-24 PROCEDURE — 99214 OFFICE O/P EST MOD 30 MIN: CPT | Performed by: INTERNAL MEDICINE

## 2024-06-24 PROCEDURE — 3079F DIAST BP 80-89 MM HG: CPT | Performed by: INTERNAL MEDICINE

## 2024-06-24 PROCEDURE — 3074F SYST BP LT 130 MM HG: CPT | Performed by: INTERNAL MEDICINE

## 2024-06-24 PROCEDURE — 99212 OFFICE O/P EST SF 10 MIN: CPT | Performed by: INTERNAL MEDICINE

## 2024-06-24 RX ORDER — ASPIRIN 81 MG/1
81 TABLET ORAL DAILY
Qty: 100 TABLET | Refills: 4 | Status: SHIPPED | OUTPATIENT
Start: 2024-06-24

## 2024-06-24 RX ORDER — LOSARTAN POTASSIUM AND HYDROCHLOROTHIAZIDE 12.5; 1 MG/1; MG/1
1 TABLET ORAL DAILY
Qty: 90 TABLET | Refills: 4 | Status: SHIPPED | OUTPATIENT
Start: 2024-06-24

## 2024-06-24 RX ORDER — METOPROLOL SUCCINATE 50 MG/1
50 TABLET, EXTENDED RELEASE ORAL DAILY
Qty: 90 TABLET | Refills: 4 | Status: SHIPPED | OUTPATIENT
Start: 2024-06-24

## 2024-06-24 RX ORDER — FOLIC ACID 1 MG/1
1 TABLET ORAL DAILY
COMMUNITY

## 2024-06-24 ASSESSMENT — ENCOUNTER SYMPTOMS
BACK PAIN: 0
COUGH: 0
CONSTIPATION: 0
PND: 0
DIZZINESS: 0
ABDOMINAL PAIN: 0
NAUSEA: 0
BLURRED VISION: 0
FLANK PAIN: 0
FEVER: 0
SYNCOPE: 0
SHORTNESS OF BREATH: 0
WEIGHT LOSS: 0
DEPRESSION: 0
DYSPNEA ON EXERTION: 0
HEARTBURN: 0
PALPITATIONS: 0
NEAR-SYNCOPE: 0
ORTHOPNEA: 0
DECREASED APPETITE: 0
VOMITING: 0
CLAUDICATION: 0
WEIGHT GAIN: 0
IRREGULAR HEARTBEAT: 0
DIARRHEA: 0
ALTERED MENTAL STATUS: 0

## 2024-06-24 NOTE — PROGRESS NOTES
Cardiology Note    Chief Complaint   Patient presents with    Atrial Fibrillation     F/V Dx:Atrial fibrillation, unspecified type (HCC)         History of Present Illness: Oswaldo Baptiste is a 44 y.o. male who presents for follow up visit. Referred by Bre Rowell PA-C for atrial fibrillation.     Since last visit quit alcohol. Has been sober 6 months he says. Palpitations / AF resolved. No other cardiac complaints. Completed monitor see below. Compliant with medications and denies adverse effects.     Admission 12/27/23 [facility] for AF RVR, alcohol abuse, hypertension. Seen by Dr Gifford cardiology. Converted to sinus on amiodarone. Started on eliquis. Documentation notes echo unremarkable.     Review of Systems   Constitutional: Negative for decreased appetite, fever, malaise/fatigue, weight gain and weight loss.   HENT:  Negative for congestion and nosebleeds.    Eyes:  Negative for blurred vision.   Cardiovascular:  Negative for chest pain, claudication, dyspnea on exertion, irregular heartbeat, leg swelling, near-syncope, orthopnea, palpitations, paroxysmal nocturnal dyspnea and syncope.   Respiratory:  Negative for cough and shortness of breath.    Endocrine: Negative for cold intolerance and heat intolerance.   Skin:  Negative for rash.   Musculoskeletal:  Negative for back pain.   Gastrointestinal:  Negative for abdominal pain, constipation, diarrhea, heartburn, melena, nausea and vomiting.   Genitourinary:  Negative for dysuria, flank pain and hematuria.   Neurological:  Negative for dizziness.   Psychiatric/Behavioral:  Negative for altered mental status and depression.          History reviewed. No pertinent past medical history.      History reviewed. No pertinent surgical history.      Current Outpatient Medications   Medication Sig Dispense Refill    folic acid (FOLVITE) 1 MG Tab Take 1 mg by mouth every day.      metoprolol SR (TOPROL XL) 50 MG TABLET SR 24 HR Take 1 Tablet by mouth every day. 90  Tablet 4    aspirin 81 MG EC tablet Take 1 Tablet by mouth every day. 100 Tablet 4    losartan-hydrochlorothiazide (HYZAAR) 100-12.5 MG per tablet Take 1 Tablet by mouth every day. 90 Tablet 4    multivitamin (THERAGRAN) Tab Take 1 Tab by mouth every day.      ibuprofen (MOTRIN) 200 MG Tab Take 600 mg by mouth every 8 hours as needed for Mild Pain.       No current facility-administered medications for this visit.         No Known Allergies      History reviewed. No pertinent family history.      Social History     Socioeconomic History    Marital status: Single     Spouse name: Not on file    Number of children: Not on file    Years of education: Not on file    Highest education level: Not on file   Occupational History    Not on file   Tobacco Use    Smoking status: Former     Current packs/day: 0.00     Types: Cigarettes     Quit date: 2/15/2011     Years since quittin.3    Smokeless tobacco: Never    Tobacco comments:     2  daily   Substance and Sexual Activity    Alcohol use: Not Currently    Drug use: Not Currently    Sexual activity: Not on file   Other Topics Concern    Not on file   Social History Narrative    Not on file     Social Determinants of Health     Financial Resource Strain: Not on file   Food Insecurity: Not on file   Transportation Needs: Not on file   Physical Activity: Not on file   Stress: Not on file   Social Connections: Not on file   Intimate Partner Violence: Not on file   Housing Stability: Not on file         Physical Exam:  Ambulatory Vitals  /84 (BP Location: Left arm, Patient Position: Sitting, BP Cuff Size: Adult)   Pulse 81   Resp 14   Ht 1.829 m (6')   Wt 88.5 kg (195 lb)   SpO2 96%    BP Readings from Last 4 Encounters:   24 120/84   24 130/74   18 127/75   07/15/16 149/95     Weight/BMI:   Vitals:    24 1514   BP: 120/84   Weight: 88.5 kg (195 lb)   Height: 1.829 m (6')    Body mass index is 26.45 kg/m².  Wt Readings from Last 4  Encounters:   06/24/24 88.5 kg (195 lb)   02/08/24 90.3 kg (199 lb)   07/03/18 89.9 kg (198 lb 3.1 oz)   07/15/16 90.6 kg (199 lb 11.8 oz)       Physical Exam  Constitutional:       General: He is not in acute distress.  HENT:      Head: Normocephalic and atraumatic.   Eyes:      Conjunctiva/sclera: Conjunctivae normal.      Pupils: Pupils are equal, round, and reactive to light.   Neck:      Vascular: No JVD.   Cardiovascular:      Rate and Rhythm: Normal rate and regular rhythm.      Heart sounds: Normal heart sounds. No murmur heard.     No friction rub. No gallop.   Pulmonary:      Effort: Pulmonary effort is normal. No respiratory distress.      Breath sounds: Normal breath sounds. No wheezing or rales.   Chest:      Chest wall: No tenderness.   Abdominal:      General: Bowel sounds are normal. There is no distension.      Palpations: Abdomen is soft.   Musculoskeletal:      Cervical back: Normal range of motion and neck supple.   Skin:     General: Skin is warm and dry.   Neurological:      Mental Status: He is alert and oriented to person, place, and time.   Psychiatric:         Mood and Affect: Affect normal.         Judgment: Judgment normal.         Lab Data Review:  Lab Results   Component Value Date/Time    CHOLSTRLTOT 153 07/03/2018 03:56 AM    LDL 97 07/03/2018 03:56 AM    HDL 34 (A) 07/03/2018 03:56 AM    TRIGLYCERIDE 111 07/03/2018 03:56 AM       Lab Results   Component Value Date/Time    SODIUM 144 07/03/2018 03:56 AM    POTASSIUM 3.7 07/03/2018 03:56 AM    CHLORIDE 110 07/03/2018 03:56 AM    CO2 20 07/03/2018 03:56 AM    GLUCOSE 103 (H) 07/03/2018 03:56 AM    BUN 14 07/03/2018 03:56 AM    CREATININE 0.76 07/03/2018 03:56 AM     CrCl cannot be calculated (Patient's most recent lab result is older than the maximum 7 days allowed.).  Lab Results   Component Value Date/Time    ALKPHOSPHAT 71 07/02/2018 06:55 PM    ASTSGOT 18 07/02/2018 06:55 PM    ALTSGPT 25 07/02/2018 06:55 PM    TBILIRUBIN 0.6  "07/02/2018 06:55 PM      Lab Results   Component Value Date/Time    WBC 9.4 07/03/2018 03:56 AM     Lab Results   Component Value Date/Time    HBA1C 5.2 07/03/2018 12:12 AM     No components found for: \"TROP\"      Cardiac Imaging and Procedures Review:      EKG 2/8/24 interpreted by me sinus 78    Mpi spect 7/3/2018   NUCLEAR IMAGING INTERPRETATION    No myocardial infarct or inducible ischemia.    Normal LEFT4 ventricular function.     Procedure: zio monitor; 13d13h; 2/15/24   Indication: unspecified atrial fibrillation   Quality: good   Findings:   Underlying rhythm: Predominantly sinus rhythm with average rate 69 bpm. No atrial fibrillation nor flutter detected.   Atrial events: Rare ectopy.   Ventricular events: Rare ectopy.   Patient events: Asymptomatic.   Impressions:   Normal monitor result.     Medical Decision Making:  Problem List Items Addressed This Visit       Essential hypertension    Relevant Medications    metoprolol SR (TOPROL XL) 50 MG TABLET SR 24 HR    losartan-hydrochlorothiazide (HYZAAR) 100-12.5 MG per tablet    Atrial fibrillation (HCC)    Relevant Medications    metoprolol SR (TOPROL XL) 50 MG TABLET SR 24 HR    aspirin 81 MG EC tablet    losartan-hydrochlorothiazide (HYZAAR) 100-12.5 MG per tablet       Paroxysmal AF - no recurrence. Stable event monitor. chadsvasc 1. Continue aspirin and metoprolol. Continue alcohol cessation.     HTN - controlled. Continue regimen.    It was my pleasure to meet with Mr. Isra Baptiste.                        "

## 2025-06-27 DIAGNOSIS — I48.91 ATRIAL FIBRILLATION, UNSPECIFIED TYPE (HCC): ICD-10-CM

## 2025-06-27 RX ORDER — METOPROLOL SUCCINATE 50 MG/1
50 TABLET, EXTENDED RELEASE ORAL DAILY
Qty: 90 TABLET | Refills: 0 | Status: SHIPPED | OUTPATIENT
Start: 2025-06-27

## 2025-06-27 NOTE — TELEPHONE ENCOUNTER
Is the patient due for a refill? Yes    Was the patient seen the last 15 months? Yes    Date of last office visit: 6/24/25    Does the patient have an upcoming appointment?  No       Provider to refill:VR    Does the patient have half-way Plus and need 100-day supply? (This applies to ALL medications) Patient does not have SCP

## 2025-06-27 NOTE — TELEPHONE ENCOUNTER
Courtesy refill provided, will need appointment for future refills.    Scheduling please contact patient to schedule appointment.